# Patient Record
Sex: MALE | Race: WHITE | NOT HISPANIC OR LATINO | Employment: FULL TIME | URBAN - METROPOLITAN AREA
[De-identification: names, ages, dates, MRNs, and addresses within clinical notes are randomized per-mention and may not be internally consistent; named-entity substitution may affect disease eponyms.]

---

## 2022-12-03 ENCOUNTER — HOSPITAL ENCOUNTER (EMERGENCY)
Facility: MEDICAL CENTER | Age: 22
End: 2022-12-03
Attending: EMERGENCY MEDICINE
Payer: COMMERCIAL

## 2022-12-03 VITALS
WEIGHT: 188.3 LBS | OXYGEN SATURATION: 98 % | HEART RATE: 55 BPM | RESPIRATION RATE: 14 BRPM | TEMPERATURE: 97.6 F | SYSTOLIC BLOOD PRESSURE: 113 MMHG | HEIGHT: 72 IN | DIASTOLIC BLOOD PRESSURE: 63 MMHG | BODY MASS INDEX: 25.5 KG/M2

## 2022-12-03 DIAGNOSIS — S05.01XA ABRASION OF RIGHT CORNEA, INITIAL ENCOUNTER: Primary | ICD-10-CM

## 2022-12-03 DIAGNOSIS — H57.89 BURNING SENSATION OF EYE: ICD-10-CM

## 2022-12-03 PROCEDURE — 700101 HCHG RX REV CODE 250: Performed by: EMERGENCY MEDICINE

## 2022-12-03 PROCEDURE — 99283 EMERGENCY DEPT VISIT LOW MDM: CPT

## 2022-12-03 RX ORDER — PROPARACAINE HYDROCHLORIDE 5 MG/ML
1 SOLUTION/ DROPS OPHTHALMIC ONCE
Status: COMPLETED | OUTPATIENT
Start: 2022-12-03 | End: 2022-12-03

## 2022-12-03 RX ORDER — POLYMYXIN B SULFATE AND TRIMETHOPRIM 1; 10000 MG/ML; [USP'U]/ML
1 SOLUTION OPHTHALMIC ONCE
Status: DISCONTINUED | OUTPATIENT
Start: 2022-12-03 | End: 2022-12-03

## 2022-12-03 RX ORDER — POLYMYXIN B SULFATE AND TRIMETHOPRIM 1; 10000 MG/ML; [USP'U]/ML
1 SOLUTION OPHTHALMIC EVERY 4 HOURS
Qty: 10 ML | Refills: 0 | Status: SHIPPED | OUTPATIENT
Start: 2022-12-03

## 2022-12-03 RX ORDER — ERYTHROMYCIN 5 MG/G
1 OINTMENT OPHTHALMIC ONCE
Status: COMPLETED | OUTPATIENT
Start: 2022-12-03 | End: 2022-12-03

## 2022-12-03 RX ADMIN — ERYTHROMYCIN 1 APPLICATION: 5 OINTMENT OPHTHALMIC at 04:54

## 2022-12-03 RX ADMIN — FLUORESCEIN SODIUM 1 MG: 1 STRIP OPHTHALMIC at 03:00

## 2022-12-03 RX ADMIN — PROPARACAINE HYDROCHLORIDE 1 DROP: 5 SOLUTION/ DROPS OPHTHALMIC at 03:00

## 2022-12-03 NOTE — DISCHARGE INSTRUCTIONS
Please follow-up with the ophthalmologist, call their office to make appointment to be seen.  Please use the Polytrim drops in each eye every 4 hours while awake for the next 5 days.  Do not wear contacts.  If you have worsening symptoms, please return to the ED.  Please take 1000 mg of Tylenol 3 times a day and 800 mg of Motrin 3 times a day to help with symptoms.  Come back if you have worsening symptoms, changes in vision or fever.  Thank you for coming in today.

## 2022-12-03 NOTE — ED TRIAGE NOTES
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

## 2022-12-03 NOTE — ED PROVIDER NOTES
ED Provider Note  ED Provider Note    Scribed for Lucian Mac by Lucian Mac. 12/3/2022  3:17 AM    Primary care provider: Sherrie Donahue M.D.  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      HPI  Dez Valle is a 22 y.o. male who presents to the Emergency Department  with bilateral eye pain.  Patient is a , is working with multiple welding gases today although he is not sure what gases these are.  He is not sure if he might of had something under his eyes he was welding, he was wearing mask, no contact lenses.  States he stopped and felt fine after work but went to bed this evening and when he woke up he had bilateral eye pain that was significant, photophobia, difficulty with visual acuity due to the pain.  No fevers.  No discharge although he thinks his eyes were watery.  Takes no medications, is otherwise healthy, review of systems otherwise negative.  Quality: Burning eye sensation bilaterally  Duration: 3 hours  Severity: Moderate  Associated sx: Watery eye discharge    REVIEW OF SYSTEMS  As above, all other systems reviewed and are negative.   See HPI for further details.     PAST MEDICAL HISTORY   has a past medical history of Allergy, unspecified not elsewhere classified.  SURGICAL HISTORY  patient denies any surgical history  SOCIAL HISTORY  Social History     Tobacco Use    Smoking status: Never    Smokeless tobacco: Never   Substance Use Topics    Alcohol use: No    Drug use: No      Social History     Substance and Sexual Activity   Drug Use No     FAMILY HISTORY  History reviewed. No pertinent family history.  CURRENT MEDICATIONS  Home Medications       Reviewed by Raleigh Galan,  GWEN (Registered Nurse) on 12/03/22 at 0135  Med List Status: Not Addressed     Medication Last Dose Status   erythromycin 5 MG/GM Ointment  Active                  ALLERGIES  Allergies   Allergen Reactions    Pcn [Penicillins] Rash     PHYSICAL EXAM  VITAL SIGNS:   Vitals:    12/03/22 0327 12/03/22 0328   BP:  113/63   Pulse: (!) 55    Resp: 14    Temp: 36.4 °C (97.6 °F)    TempSrc: Temporal    SpO2: 98%    Weight: 85.4 kg (188 lb 4.8 oz)    Height: 1.829 m (6')      Vitals: My interpretation: normotensive possible chemical exposure to the eye with welding gases but this is unclear, not tachycardic, afebrile, not hypoxic    Reinterpretation of vitals: Unchanged    PE:   Gen: sitting comfortably, speaking clearly, appears in no acute distress   ENT: Mucous membranes moist, posterior pharynx clear, uvula midline, nares patent bilaterally   Eye exam: After application of proparacaine, visual acuity is intact, pupils are equal reactive bilaterally, extraocular movements of the eyes are normal.  There is no obvious discharge.  No proptosis, no periorbital swelling.  No corneal injection.  Proparacaine completely resolved the patient's pain.  Under Baldwin lamp, negative Kulwant sign after fluorescein application, no corneal abrasions or fluorescein uptake appreciated, no keratosis, no punctate lesions.  There is a very small area of corneal abrasion potentially to the right eye at the 6:00 region of the iris.  Neck: Supple, FROM  Pulmonary: Lungs are clear to auscultation bilaterally. No tachypnea  CV:  RRR, no murmur appreciated, pulses 2+ in both upper and lower extremities  Abdomen: soft, NT/ND; no rebound/guarding  : no CVA or suprapubic tenderness   Neuro: A&Ox4 (person, place, time, situation), speech fluent, gait steady, no focal deficits appreciated  Skin: No rash or lesions.  No pallor or jaundice.  No cyanosis.  Warm and dry.     DIAGNOSTIC STUDIES / PROCEDURES    COURSE & MEDICAL DECISION MAKING  Nursing  notes, VS, PMSFHx, labs, imaging, EKG reviewed in chart.    MDM: 3:17 AM Dez Valle is a 22 y.o. male who presented with possible chemical exposure to the eye with welding gases but this is unclear.  Patient initially concern for possible foreign bodies.  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  Went to bed and felt fine this evening but woke up around 1 AM, 2 hours prior to arrival and had bilateral eye pain, photophobia reports watery discharge, and significant burning pain in both eyes.  Vital signs unremarkable, and upon arrival here he is inability to test visual acuity secondary to pain.  Application of proparacaine completely resolved patient's symptoms. Eye exam: After application of proparacaine, visual acuity is intact, pupils are equal reactive bilaterally, extraocular movements of the eyes are normal.  There is no obvious discharge.  No proptosis, no periorbital swelling.  No corneal injection.  Proparacaine completely resolved the patient's pain.  Under Baldwin lamp, negative Kulwant sign after fluorescein application, no corneal abrasions or fluorescein uptake appreciated, no keratosis, no punctate lesions.  There is a very small area of corneal abrasion potentially to the right eye at the 6:00 region of the iris.  Considering possible questionable chemical exposure considering bilateral symptoms, patient had Enoch lenses attempted to be applied but patient unable to be tolerated.  He did tolerate to 150 cc in each eye manually irrigated by nursing.  He was provided with Polytrim here in the ED and Rx for the same for every 4 hours drops to the eyes when he is awake bilaterally.  We will follow-up with ophthalmology for further evaluation and treatment.  Recommend Tylenol and Motrin for pain.  Patient verbalized understand strict return precautions outpatient follow-up plan and is amenable.    FINAL IMPRESSION  1. Abrasion of right cornea, initial encounter Acute    2. Burning sensation of eye Acute      The note accurately reflects work and decisions made by me.  Lucian Mac  12/3/2022  3:17 AM

## 2023-05-26 ENCOUNTER — APPOINTMENT (OUTPATIENT)
Dept: RADIOLOGY | Facility: MEDICAL CENTER | Age: 23
End: 2023-05-26
Attending: STUDENT IN AN ORGANIZED HEALTH CARE EDUCATION/TRAINING PROGRAM
Payer: COMMERCIAL